# Patient Record
(demographics unavailable — no encounter records)

---

## 2025-03-04 NOTE — PHYSICAL EXAM
[Tired appearing] : tired appearing [Erythematous Oropharynx] : erythematous oropharynx [NL] : warm, clear [Lethargic] : not lethargic

## 2025-03-04 NOTE — PLAN
[TextEntry] : +COVID-19 DISCUSSED MANDATORY ISOLATION.  STRONGLY RECOMMEND NEGATIVE RAPID COVID TEST X 2 BEFORE DISCONTINUING ISOLATION. SUPPORTIVE CARE DISCUSSED AT HOME PRECAUTIONS TO REDUCE CHANCES OF SPREAD TO CURRENTLY UNINFECTED HOUSEHOLD MEMBERS IF APPLICABLE DISCUSSED TIMING OF TESTING OF HOUSEHOLD MEMBERS IF APPLICABLE CALL BACK FOR ACUTE WORSENING OF SYMPTOMS TO ER FOR SIGNS OF RESP DISTRESS, DEHYDRATION,  OR SIGNIFICANT ILL APPEARANCE

## 2025-05-07 NOTE — HISTORY OF PRESENT ILLNESS
[Mother] : mother [Grade: ____] : Grade: [unfilled] [Yes] : Patient goes to dentist yearly [Toothpaste] : Primary Fluoride Source: Toothpaste [Up to date] : Up to date [Normal] : normal [LMP: _____] : LMP: [unfilled] [Eats meals with family] : eats meals with family [Has family members/adults to turn to for help] : has family members/adults to turn to for help [Is permitted and is able to make independent decisions] : Is permitted and is able to make independent decisions [Eats regular meals including adequate fruits and vegetables] : eats regular meals including adequate fruits and vegetables [Drinks non-sweetened liquids] : drinks non-sweetened liquids  [Calcium source] : calcium source [Has friends] : has friends [Uses electronic nicotine delivery system] : uses electronic nicotine delivery system [No] : Patient has not had sexual intercourse [Has ways to cope with stress] : has ways to cope with stress [Displays self-confidence] : displays self-confidence [With Teen] : teen [NO] : No [Sleep Concerns] : no sleep concerns [At least 1 hour of physical activity a day] : at least 1 hour of physical activity a day [Screen time (except homework) less than 2 hours a day] : no screen time (except homework) less than 2 hours a day [Has interests/participates in community activities/volunteers] : has interests/participates in community activities/volunteers. [Exposure to electronic nicotine delivery system] : no exposure to electronic nicotine delivery system [Uses tobacco] : does not use tobacco [Exposure to tobacco] : no exposure to tobacco [Uses drugs] : does not use drugs  [Exposure to drugs] : no exposure to drugs [Drinks alcohol] : does not drink alcohol [Exposure to alcohol] : no exposure to alcohol [Has problems with sleep] : does not have problems with sleep [Gets depressed, anxious, or irritable/has mood swings] : does not get depressed, anxious, or irritable/has mood swings [Has thought about hurting self or considered suicide] : has not thought about hurting self or considered suicide [de-identified] : Needed fillings for cavities. [de-identified] : [employer]; School is "okay", failing math and health. Did not want to make new friends but has previous friends,  [de-identified] : Drinking water [de-identified] : Plays volleyball. [de-identified] : Heterosexual.  [de-identified] : Feeling safe at home. Average mood is "okay".  [FreeTextEntry1] : Stopped Methylphenidate about 2-3 months ago because did not seem to be helping per mom, and there were focus concerns despite medication increase. Teen is concerned however that her grades are suffering since stopping the medication.

## 2025-05-07 NOTE — PHYSICAL EXAM

## 2025-05-07 NOTE — DISCUSSION/SUMMARY
[Physical Growth and Development] : physical growth and development [Social and Academic Competence] : social and academic competence [Emotional Well-Being] : emotional well-being [Risk Reduction] : risk reduction [Violence and Injury Prevention] : violence and injury prevention [Mother] : mother [Full Activity without restrictions including Physical Education & Athletics] : Full Activity without restrictions including Physical Education & Athletics [FreeTextEntry1] :  14 year F with ADHD, currently off meds but experiencing school struggles presenting for WCC.PE wnl. CRAFFT and PHQ-9 negative.   Continue balanced diet with all food groups. Discussed 5-2-1-0 plan: Consume at least 5 servings of fruit and vegetables a day, reduce screen time to <2 hours, at least 1 hour of physical activity a day, and 0 sweetened beverages. Parent in agreement. Brush teeth twice a day with toothbrush. Recommend visit to dentist. Maintain consistent daily routines and sleep schedule. Personal hygiene, puberty, and sexual health reviewed. Risky behaviors assessed. School discussed. Limit screen time to no more than 2 hours per day. Encourage physical activity. Return 1 year for routine well child check. F/u labs C/w healthier habits Quit nemoing [facility] info provided